# Patient Record
Sex: FEMALE | Race: WHITE | NOT HISPANIC OR LATINO | Employment: UNEMPLOYED | URBAN - METROPOLITAN AREA
[De-identification: names, ages, dates, MRNs, and addresses within clinical notes are randomized per-mention and may not be internally consistent; named-entity substitution may affect disease eponyms.]

---

## 2022-12-09 ENCOUNTER — OFFICE VISIT (OUTPATIENT)
Dept: FAMILY MEDICINE CLINIC | Facility: CLINIC | Age: 22
End: 2022-12-09

## 2022-12-09 VITALS
OXYGEN SATURATION: 99 % | BODY MASS INDEX: 26.65 KG/M2 | HEART RATE: 94 BPM | RESPIRATION RATE: 16 BRPM | TEMPERATURE: 97.2 F | HEIGHT: 66 IN | WEIGHT: 165.8 LBS

## 2022-12-09 DIAGNOSIS — K03.6 DENTAL PLAQUE ON MULTIPLE TEETH: ICD-10-CM

## 2022-12-09 DIAGNOSIS — Z13.220 SCREENING FOR LIPID DISORDERS: ICD-10-CM

## 2022-12-09 DIAGNOSIS — Z13.1 SCREENING FOR DIABETES MELLITUS: ICD-10-CM

## 2022-12-09 DIAGNOSIS — Z83.3 FAMILY HISTORY OF DIABETES MELLITUS IN FATHER: ICD-10-CM

## 2022-12-09 DIAGNOSIS — R56.9 SEIZURES (HCC): ICD-10-CM

## 2022-12-09 DIAGNOSIS — G04.00 ADEM (ACUTE DISSEMINATED ENCEPHALOMYELITIS): ICD-10-CM

## 2022-12-09 DIAGNOSIS — Z01.818 PREOP EXAMINATION: Primary | ICD-10-CM

## 2022-12-09 DIAGNOSIS — F84.0 AUTISTIC SPECTRUM DISORDER: ICD-10-CM

## 2022-12-09 DIAGNOSIS — R30.0 DYSURIA: ICD-10-CM

## 2022-12-09 DIAGNOSIS — K59.00 CONSTIPATION, UNSPECIFIED CONSTIPATION TYPE: ICD-10-CM

## 2022-12-09 DIAGNOSIS — N94.6 DYSMENORRHEA: ICD-10-CM

## 2022-12-09 LAB
SL AMB  POCT GLUCOSE, UA: ABNORMAL
SL AMB LEUKOCYTE ESTERASE,UA: 75
SL AMB POCT BILIRUBIN,UA: ABNORMAL
SL AMB POCT BLOOD,UA: 250
SL AMB POCT CLARITY,UA: CLEAR
SL AMB POCT COLOR,UA: CLEAR
SL AMB POCT KETONES,UA: ABNORMAL
SL AMB POCT NITRITE,UA: ABNORMAL
SL AMB POCT PH,UA: 7
SL AMB POCT SPECIFIC GRAVITY,UA: 1
SL AMB POCT URINE PROTEIN: ABNORMAL
SL AMB POCT UROBILINOGEN: ABNORMAL

## 2022-12-09 RX ORDER — QUETIAPINE FUMARATE 25 MG/1
25 TABLET, FILM COATED ORAL
COMMUNITY
Start: 2022-10-11

## 2022-12-09 RX ORDER — SACCHAROMYCES BOULARDII 250 MG
250 CAPSULE ORAL 2 TIMES DAILY
COMMUNITY

## 2022-12-09 RX ORDER — QUETIAPINE FUMARATE 100 MG/1
100 TABLET, FILM COATED ORAL
COMMUNITY
Start: 2022-10-16

## 2022-12-09 RX ORDER — ARIPIPRAZOLE 2 MG/1
2 TABLET ORAL
COMMUNITY

## 2022-12-09 RX ORDER — ZONISAMIDE 100 MG/1
CAPSULE ORAL
COMMUNITY
Start: 2022-12-05

## 2022-12-09 RX ORDER — DIPHENOXYLATE HYDROCHLORIDE AND ATROPINE SULFATE 2.5; .025 MG/1; MG/1
1 TABLET ORAL DAILY
COMMUNITY

## 2022-12-09 RX ORDER — LORATADINE 10 MG/1
10 TABLET ORAL EVERY MORNING
COMMUNITY
Start: 2022-09-08

## 2022-12-09 RX ORDER — CHOLECALCIFEROL (VITAMIN D3) 25 MCG
1000 CAPSULE ORAL DAILY
COMMUNITY
Start: 2022-11-21

## 2022-12-09 RX ORDER — LORAZEPAM 2 MG/1
2 TABLET ORAL
COMMUNITY

## 2022-12-09 RX ORDER — DIAZEPAM 10 MG/2ML
GEL RECTAL ONCE
COMMUNITY

## 2022-12-09 RX ORDER — FLUOXETINE 10 MG/1
10 CAPSULE ORAL DAILY
COMMUNITY
Start: 2022-12-06

## 2022-12-09 RX ORDER — POLYETHYLENE GLYCOL 3350 17 G/DOSE
POWDER (GRAM) ORAL
COMMUNITY
Start: 2022-09-10

## 2022-12-09 RX ORDER — LAMOTRIGINE 150 MG/1
150 TABLET ORAL EVERY MORNING
COMMUNITY
Start: 2022-12-05

## 2022-12-09 NOTE — PROGRESS NOTES
Assessment/Plan:    1  Preop examination    2  Dental plaque on multiple teeth    3  Dysuria  -     POCT urine dip auto non-scope    4  Autistic spectrum disorder    5  ADEM (acute disseminated encephalomyelitis)  -     CBC and differential; Future  -     Comprehensive metabolic panel; Future  -     Lipid panel; Future  -     Magnesium; Future  -     HEMOGLOBIN A1C W/ EAG ESTIMATION; Future  -     TSH, 3rd generation; Future  -     Hepatic function panel; Future  -     T4, free; Future  -     Vitamin D 25 hydroxy; Future  -     Iron, TIBC and Ferritin Panel; Future  -     Lead, blood; Future  -     Mercury, blood; Future  -     Zonisamide level; Future  -     Lamotrigine level; Future  -     Gamma GT; Future  -     CBC and differential  -     Comprehensive metabolic panel  -     Lipid panel  -     Magnesium  -     HEMOGLOBIN A1C W/ EAG ESTIMATION  -     TSH, 3rd generation  -     Hepatic function panel  -     T4, free  -     Vitamin D 25 hydroxy  -     Lead, blood  -     Mercury, blood  -     Zonisamide level  -     Lamotrigine level  -     Gamma GT    6  Seizures ([address] )  -     T4, free; Future  -     Vitamin D 25 hydroxy; Future  -     Iron, TIBC and Ferritin Panel; Future  -     Lead, blood; Future  -     Mercury, blood; Future  -     Zonisamide level; Future  -     Lamotrigine level; Future  -     Gamma GT; Future  -     T4, free  -     Vitamin D 25 hydroxy  -     Lead, blood  -     Mercury, blood  -     Zonisamide level  -     Lamotrigine level  -     Gamma GT    7  Dysmenorrhea    8  Constipation, unspecified constipation type    9  Screening for lipid disorders    10  Screening for diabetes mellitus  -     HEMOGLOBIN A1C W/ EAG ESTIMATION; Future  -     HEMOGLOBIN A1C W/ EAG ESTIMATION    11  Family history of diabetes mellitus in father  -     HEMOGLOBIN A1C W/ EAG ESTIMATION; Future  -     HEMOGLOBIN A1C W/ EAG ESTIMATION    12   BMI 26 0-26 9,adult        forms completed /faxed    Subjective:      Patient ID: Daina Jackson is a 20 y o  female  Chief Complaint   Patient presents with   • Pre-op Exam     Dental cleaning    • Physical Exam       HPI  22yo developmentally delayed pt in w mother for pre-op exam for dental cleaning/work to be done under general anesthesia,   Date of procedure: 12/19/2022  Dental specialist/oral surgeon thru [address] [facility]  No acute c/o at time of visit and no known recent illness exposures  Pertinent medical and surgical history reviewed in problem lists  Pt able to walk 4 blocks or 2 flights of stairs without any concerning cardiovascular symptoms  Pt w/o symptoms of easy bruising/bleeding/chest pain/palitations/new or changing edema/cough/wheezing/dyspnea  Pt w no hx of alcohol intake, tobacco or illicit drug use  The patient's home  Living situation is secure and supportive and there are no post-op concerns in this regard  The following portions of the patient's history were reviewed and updated as appropriate: allergies, current medications, past family history, past medical history, past social history, past surgical history and problem list     Past Medical History:   Diagnosis Date   • ADEM (acute disseminated encephalomyelitis)    • Developmental delay    • Expressive dysphasia    • Humerus fracture     right--nondisplaced   • Seizures ([address] ) 2007     Past Surgical History:   Procedure Laterality Date   • DENTAL EXAMINATION UNDER ANESTHESIA      every 3 years     Review of Systems   Unable to perform ROS: Other (developmental delay; Expressive language d/o)         Current Outpatient Medications   Medication Sig Dispense Refill   • ARIPiprazole (ABILIFY) 2 mg tablet Take 2 mg by mouth     • CVS D3 25 MCG (1000 UT) capsule Take 1,000 Units by mouth daily     • CVS Purelax 17 GM/SCOOP powder 17 GM BY MOUTH EVERY MORNING GIVE 8 TSP EVERY MORNING DISSOLVE IN 8OZ OF WATER       • diazepam (Diastat AcuDial) 10 mg Insert into the rectum once     • FLUoxetine (PROzac) 10 mg capsule Take 10 mg by mouth daily     • lamoTRIgine (LaMICtal) 150 MG tablet Take 150 mg by mouth every morning     • loratadine (CLARITIN) 10 mg tablet Take 10 mg by mouth every morning     • LORazepam (ATIVAN) 2 mg tablet Take 2 mg by mouth     • multivitamin (THERAGRAN) TABS Take 1 tablet by mouth daily     • QUEtiapine (SEROquel) 100 mg tablet Take 100 mg by mouth daily at bedtime     • QUEtiapine (SEROquel) 25 mg tablet Take 25 mg by mouth daily at bedtime     • saccharomyces boulardii (FLORASTOR) 250 mg capsule Take 250 mg by mouth 2 (two) times a day     • Trimethobenzamide-Benzocaine (TEBAMIDE RE) Insert into the rectum     • zonisamide (ZONEGRAN) 100 mg capsule TAKE 1 CAPSULE BY MOUTH AT BEDTIME  INS ONLY COVERS 30 DAYS       No current facility-administered medications for this visit  No Known Allergies       Objective:    Pulse 94   Temp (!) 97 2 °F (36 2 °C)   Resp 16   Ht 5' 6" (1 676 m)   Wt 75 2 kg (165 lb 12 8 oz)   SpO2 99%   BMI 26 76 kg/m²        Physical Exam  Vitals (unable to obtain BP due to pt anxiety) and nursing note reviewed  Constitutional:       General: She is not in acute distress  Eyes:      General: No scleral icterus  Conjunctiva/sclera: Conjunctivae normal    Cardiovascular:      Rate and Rhythm: Normal rate and regular rhythm  Pulmonary:      Effort: Pulmonary effort is normal  No respiratory distress  Breath sounds: Normal breath sounds  Abdominal:      General: Bowel sounds are normal       Palpations: Abdomen is soft  Tenderness: There is no abdominal tenderness  Musculoskeletal:      Cervical back: Neck supple  Right lower leg: No edema  Left lower leg: No edema  Skin:     General: Skin is warm and dry  Coloration: Skin is not jaundiced  Neurological:      Mental Status: She is alert        Comments: Developmentally delayed;  Expressive dysphasia   Psychiatric:      Comments: anxious        Disha Valle MD

## 2022-12-11 PROBLEM — K05.10: Status: ACTIVE | Noted: 2022-12-11

## 2022-12-11 PROBLEM — K03.6: Status: ACTIVE | Noted: 2022-12-11

## 2022-12-19 LAB — HBA1C MFR BLD HPLC: 4.7 %

## 2023-01-03 ENCOUNTER — TELEPHONE (OUTPATIENT)
Dept: FAMILY MEDICINE CLINIC | Facility: CLINIC | Age: 23
End: 2023-01-03

## 2023-01-03 NOTE — TELEPHONE ENCOUNTER
Dr Joy Tobin:    Patient's mother called asking for her results from [facility]  Please c/b to discuss further

## 2023-01-05 NOTE — TELEPHONE ENCOUNTER
Labs d/w mother-multiple abnls -including hgb=6 8 (last check from prior records found 2018=11 6)  Pt will need anemia work-up and other needed evals/testing as inpt as will need sedation to enable tests to be accomplished  Will d/w pt's neurologist-Dr Hasmukh Tejeda see if able to admit under specialist service at [facility]  LM to receive callback from [facility] in [address] [place] [address]; [facility]-#[phone]

## 2023-01-06 DIAGNOSIS — Z00.00 HEALTHCARE MAINTENANCE: ICD-10-CM

## 2023-01-06 DIAGNOSIS — K59.00 CONSTIPATION, UNSPECIFIED CONSTIPATION TYPE: Primary | ICD-10-CM

## 2023-01-06 RX ORDER — POLYETHYLENE GLYCOL 3350 17 G/DOSE
17 POWDER (GRAM) ORAL DAILY
Qty: 507 G | Refills: 3 | Status: SHIPPED | OUTPATIENT
Start: 2023-01-06

## 2023-01-06 RX ORDER — CHOLECALCIFEROL (VITAMIN D3) 25 MCG
1000 CAPSULE ORAL DAILY
Qty: 90 CAPSULE | Refills: 3 | Status: SHIPPED | OUTPATIENT
Start: 2023-01-06

## 2023-01-06 RX ORDER — LORATADINE 10 MG/1
10 TABLET ORAL EVERY MORNING
Qty: 90 TABLET | Refills: 3 | Status: SHIPPED | OUTPATIENT
Start: 2023-01-06

## 2023-01-11 DIAGNOSIS — G04.00 ADEM (ACUTE DISSEMINATED ENCEPHALOMYELITIS): ICD-10-CM

## 2023-01-11 DIAGNOSIS — F84.0 AUTISTIC SPECTRUM DISORDER: Primary | ICD-10-CM

## 2023-01-11 NOTE — TELEPHONE ENCOUNTER
Spoke w Dr Georgette Acuña 1/5/2023 re:pt labs-pt unable to be admitted to specialist's service  Advised ED  Parents took pt for ED eval at [facility]  Spoke w triage nurse Ean Rodriguez) and to MARRY Aparicio-#[phone]  Pt sedated--rpt labs showed hgb=9 7  No further work-up done--pt d/c'd for outpt follow-up  Recommended mom contact [facility] and ins co (has tried in past) to find out other available services in area  Will also further investigate services thru [facility]

## 2023-01-12 ENCOUNTER — PATIENT OUTREACH (OUTPATIENT)
Dept: FAMILY MEDICINE CLINIC | Facility: CLINIC | Age: 23
End: 2023-01-12

## 2023-01-12 NOTE — PROGRESS NOTES
Request to contact pt's parents to assist in locating services for pt and family  Reviewed chart  Pt has been involved with [facility] previously  ANIKET did not locate services within [place] [address], contacted care team as well; no services available within the network  ANIKET located several resources and agencies within the county:    [facility] in [place]: https://abilitiesnw  com/  [facility] in [place]: CreditCardReferences is  org/  [facility] in [place]:  http://www Glimpse.com/  org/  [place]: http://[employer]/  [employer]/[place]/connect-locally/service-center-locations/[facility]-behavioral-health-fvvsvfvl-rbvxvz-srbsermfnvo-[place] html    SW called pt's mother, introduced self and role in CM SW  They are new to area  The problem is that pt needs to be sedated when she gets bloodwork  They had to take pt to ED for bloodwork as pt fights when she gets bloodwork and sedation, is comative and threw up from Zofran  Tonie said no ED or [facility] knows of any place that works with adults with difficulties such as this  ANIKET could not provide any locations that may help with pt, but did offer names of the agencies above  Tonie is familiar with the [facility] and [facility] and these are right near her home, but pt remains home, versus going to a day program  However, [facility] is also looking for gynecology for pt  ANIKET suggested Tonie reach out to the agencies, as the agencies would best be able to provide some guidance, alternatives and recommendations  ANIKET offered to email the links to [facility], she stated would appreciate this    ANIKET then asked if she needed guidance from [facility] for diabetes, but Tonie declined, as pt is very difficult overall  ANIKET told Tonie that email will be sent to her today and SW will relay the above to Dr Amy Rosenberg  SW advised Tonie to feel free to contact this SW anytime with any concerns, questions or need for resources   Tonie appreciated the help  [email or web address]   Email sent   Note routed to Dr Dilcia Gonzalez

## 2023-01-18 ENCOUNTER — TELEPHONE (OUTPATIENT)
Dept: FAMILY MEDICINE CLINIC | Facility: CLINIC | Age: 23
End: 2023-01-18

## 2023-01-18 NOTE — TELEPHONE ENCOUNTER
DR Hua Dobson    Patient's mom wants to know what vitamin and dosage she should give patient due to lab results  She believes magnesium and iron are low  Please advise

## 2023-01-21 NOTE — TELEPHONE ENCOUNTER
Please inform that calcium also a little low so she may want to try OTC Pepcid complete--it has calcium and magnesium plus famotidine which will guard against gastritis  For the iron there are several chewable tabs and gummies--so whichever flavor/consistency she likes--some made by [employer] and [employer] and also they make a Flinstones vitamin w iron-even though its for kids she'll get the iron and make prefer the taste over the others      Also please find out if any of the agency names she was given were able to help ---  thanks

## 2023-02-01 ENCOUNTER — TELEPHONE (OUTPATIENT)
Dept: FAMILY MEDICINE CLINIC | Facility: CLINIC | Age: 23
End: 2023-02-01

## 2023-02-01 NOTE — TELEPHONE ENCOUNTER
Dr Aliza Lind:    Patient's mother called asking if you can place an order for cologuard  Please advise when order is in chart

## 2023-02-01 NOTE — TELEPHONE ENCOUNTER
Please advise I can place the order for Cologuard however I am not sure if the insurance will cover as colon cancer screening is recommended to start at age 39  She will need to check with her insurance plan in regards to this and then let us know if she still wants ordered

## 2023-02-07 PROBLEM — Z13.220 SCREENING FOR LIPID DISORDERS: Status: RESOLVED | Noted: 2022-12-09 | Resolved: 2023-02-07

## 2023-03-01 ENCOUNTER — OFFICE VISIT (OUTPATIENT)
Dept: FAMILY MEDICINE CLINIC | Facility: CLINIC | Age: 23
End: 2023-03-01

## 2023-03-01 VITALS
HEART RATE: 80 BPM | TEMPERATURE: 98.2 F | OXYGEN SATURATION: 99 % | WEIGHT: 159.8 LBS | BODY MASS INDEX: 25.68 KG/M2 | HEIGHT: 66 IN

## 2023-03-01 DIAGNOSIS — F84.0 AUTISTIC SPECTRUM DISORDER: ICD-10-CM

## 2023-03-01 DIAGNOSIS — R05.9 COUGH, UNSPECIFIED TYPE: Primary | ICD-10-CM

## 2023-03-01 DIAGNOSIS — R09.81 NASAL CONGESTION: ICD-10-CM

## 2023-03-01 DIAGNOSIS — E55.9 VITAMIN D DEFICIENCY: ICD-10-CM

## 2023-03-01 RX ORDER — AZITHROMYCIN 250 MG/1
TABLET, FILM COATED ORAL
Qty: 6 TABLET | Refills: 0 | Status: SHIPPED | OUTPATIENT
Start: 2023-03-01 | End: 2023-03-06

## 2023-03-01 RX ORDER — FLUTICASONE PROPIONATE 50 MCG
2 SPRAY, SUSPENSION (ML) NASAL DAILY
Qty: 16 G | Refills: 2 | Status: SHIPPED | OUTPATIENT
Start: 2023-03-01

## 2023-03-01 RX ORDER — CALCIUM CARBONATE/VITAMIN D3 600 MG-20
1 TABLET ORAL DAILY
Qty: 90 CAPSULE | Refills: 3 | Status: SHIPPED | OUTPATIENT
Start: 2023-03-01

## 2023-03-08 ENCOUNTER — TELEPHONE (OUTPATIENT)
Dept: FAMILY MEDICINE CLINIC | Facility: CLINIC | Age: 23
End: 2023-03-08

## 2023-03-08 NOTE — TELEPHONE ENCOUNTER
Pt saw Dr Chelsea Harrison for a sinus infection on 3/1  After finishing abx she still has the same symptoms with cough and stuffed note (clear)  Mom has been giving her dayquil and nyquil for 7 days  Is it safe to continue giving it to her?

## 2023-03-31 NOTE — PROGRESS NOTES
Assessment/Plan:    1  Cough, unspecified type  -     azithromycin (Zithromax) 250 mg tablet; Take 2 tablets (500 mg total) by mouth daily for 1 day, THEN 1 tablet (250 mg total) daily for 4 days  2  Nasal congestion  -     fluticasone (FLONASE) 50 mcg/act nasal spray; 2 sprays into each nostril daily    3  Vitamin D deficiency  -     Cholecalciferol (CVS D3) 50 MCG (2000 UT) CAPS; Take 1 capsule (2,000 Units total) by mouth in the morning    4  Autistic spectrum disorder    5  BMI 25 0-25 9,adult       Subjective:      Patient ID: Lois Rodriguez is a 20 y o  female      Chief Complaint   Patient presents with   • Cough     Pt started with cough on Sunday , post nasal no other sxs       HPI  Autistic nonverbal pt in w mother (Tonie) who reports pt w cough/congestion over past week  No fever in office today--?on/off at home, no rash or gi sxs  Has tried some otc preps w limited relief  No sig change in appetite, UO ok      The following portions of the patient's history were reviewed and updated as appropriate: allergies, current medications, past family history, past medical history, past social history, past surgical history and problem list     Review of Systems   Unable to perform ROS: Patient nonverbal         Current Outpatient Medications   Medication Sig Dispense Refill   • ARIPiprazole (ABILIFY) 2 mg tablet Take 2 mg by mouth     • Cholecalciferol (CVS D3) 50 MCG (2000 UT) CAPS Take 1 capsule (2,000 Units total) by mouth in the morning 90 capsule 3   • CVS Purelax 17 GM/SCOOP powder Take 17 g by mouth daily 507 g 3   • diazepam (DIASTAT) 10 mg Insert into the rectum once     • Ferrous Gluconate-C-Folic Acid (IRON-C PO) Take 65 mg by mouth     • FLUoxetine (PROzac) 10 mg capsule Take 10 mg by mouth daily     • fluticasone (FLONASE) 50 mcg/act nasal spray 2 sprays into each nostril daily 16 g 2   • lamoTRIgine (LaMICtal) 150 MG tablet Take 150 mg by mouth every morning     • loratadine (CLARITIN) 10 mg "tablet Take 1 tablet (10 mg total) by mouth every morning 90 tablet 3   • LORazepam (ATIVAN) 2 mg tablet Take 2 mg by mouth     • Magnesium 70 MG CAPS Take by mouth     • multivitamin (THERAGRAN) TABS Take 1 tablet by mouth daily     • QUEtiapine (SEROquel) 100 mg tablet Take 100 mg by mouth daily at bedtime     • QUEtiapine (SEROquel) 25 mg tablet Take 25 mg by mouth daily at bedtime     • saccharomyces boulardii (FLORASTOR) 250 mg capsule Take 250 mg by mouth 2 (two) times a day     • Trimethobenzamide-Benzocaine (TEBAMIDE RE) Insert into the rectum     • zonisamide (ZONEGRAN) 100 mg capsule TAKE 1 CAPSULE BY MOUTH AT BEDTIME  INS ONLY COVERS 30 DAYS       No current facility-administered medications for this visit  Objective:    Pulse 80   Temp 98 2 °F (36 8 °C)   Ht 5' 6\" (1 676 m)   Wt 72 5 kg (159 lb 12 8 oz)   LMP 02/23/2023   SpO2 99%   BMI 25 79 kg/m²        Physical Exam  Vitals and nursing note reviewed  HENT:      Nose: Congestion present  Cardiovascular:      Rate and Rhythm: Normal rate and regular rhythm  Pulmonary:      Effort: Pulmonary effort is normal  No respiratory distress  Breath sounds: Normal breath sounds  Skin:     General: Skin is warm and dry  Findings: No rash  Neurological:      Mental Status: She is alert     Psychiatric:      Comments: Cooperative w exam           En Licona MD  "

## 2023-04-25 ENCOUNTER — TELEPHONE (OUTPATIENT)
Dept: FAMILY MEDICINE CLINIC | Facility: CLINIC | Age: 23
End: 2023-04-25

## 2023-04-25 NOTE — TELEPHONE ENCOUNTER
Pt takes Claritin and Flonase--both have refills on at pharmacy--does mom need new rx to another pharm?

## 2023-04-25 NOTE — TELEPHONE ENCOUNTER
Please see attached message  Pts mom lmom asking if allergy medication was called in for pt  Please advise  Thank you  Hi, this is Ann Sin  I'm calling in reference to my daughter Dru Arshad's birthday is [birth year] again Jem Claire  I'm looking to see about whether or not [facility] prescription for her allergy meds was okayed by Doctor Carrol Mccarthy for refills  She has one left  Anyway, if you can give me a call back, I would appreciate it  It's [phone], [phone]  Thank you  Gaby Griffin

## 2023-04-30 PROBLEM — R05.9 COUGH: Status: RESOLVED | Noted: 2023-03-01 | Resolved: 2023-04-30

## 2023-05-27 DIAGNOSIS — R09.81 NASAL CONGESTION: ICD-10-CM

## 2023-05-27 RX ORDER — FLUTICASONE PROPIONATE 50 MCG
SPRAY, SUSPENSION (ML) NASAL
Qty: 16 ML | Refills: 2 | Status: SHIPPED | OUTPATIENT
Start: 2023-05-27

## 2023-06-01 ENCOUNTER — TELEPHONE (OUTPATIENT)
Dept: FAMILY MEDICINE CLINIC | Facility: CLINIC | Age: 23
End: 2023-06-01

## 2023-06-01 NOTE — TELEPHONE ENCOUNTER
Hi, this is Emory Dandy from my line [employer]  We have received a standard return order document for the mutual patient  The patient first name is [facility] and the last name is Siddhartha Esquivel  The purchase order number is [id number]  In this standard written order document the questionary page is needed  So kindly get back the providers questionary page in order to ship the equipment to the patient for incontinence supplies  And our fax number is [phone] and our callback number is [phone]  Thank you  Have a great day

## 2023-06-05 NOTE — TELEPHONE ENCOUNTER
Dr Sydnie Llanes:    Patient's mother called stating that medline is going to be refaxing over another order for diapers  The original request was sent over with the wrong size diaper  Patient needs a medium instead of large  Once received please fax to medline so order can be shipped to patient's house

## 2023-08-27 DIAGNOSIS — R09.81 NASAL CONGESTION: ICD-10-CM

## 2023-08-28 RX ORDER — FLUTICASONE PROPIONATE 50 MCG
SPRAY, SUSPENSION (ML) NASAL
Qty: 16 ML | Refills: 2 | Status: SHIPPED | OUTPATIENT
Start: 2023-08-28

## 2023-11-21 DIAGNOSIS — K59.00 CONSTIPATION, UNSPECIFIED CONSTIPATION TYPE: ICD-10-CM

## 2023-11-22 RX ORDER — POLYETHYLENE GLYCOL 3350 17 G/17G
POWDER, FOR SOLUTION ORAL
Qty: 510 G | Refills: 3 | Status: SHIPPED | OUTPATIENT
Start: 2023-11-22

## 2023-12-12 ENCOUNTER — TELEPHONE (OUTPATIENT)
Dept: FAMILY MEDICINE CLINIC | Facility: CLINIC | Age: 23
End: 2023-12-12

## 2023-12-27 ENCOUNTER — CONSULT (OUTPATIENT)
Dept: FAMILY MEDICINE CLINIC | Facility: CLINIC | Age: 23
End: 2023-12-27
Payer: COMMERCIAL

## 2023-12-27 VITALS
OXYGEN SATURATION: 98 % | TEMPERATURE: 97.3 F | HEART RATE: 98 BPM | BODY MASS INDEX: 23.95 KG/M2 | HEIGHT: 66 IN | WEIGHT: 149 LBS

## 2023-12-27 DIAGNOSIS — R56.9 SEIZURES (HCC): ICD-10-CM

## 2023-12-27 DIAGNOSIS — Z01.818 PREOP EXAMINATION: Primary | ICD-10-CM

## 2023-12-27 DIAGNOSIS — G04.00 ADEM (ACUTE DISSEMINATED ENCEPHALOMYELITIS): ICD-10-CM

## 2023-12-27 DIAGNOSIS — N93.9 ABNORMAL UTERINE BLEEDING (AUB): ICD-10-CM

## 2023-12-27 DIAGNOSIS — Z13.220 SCREENING FOR LIPID DISORDERS: ICD-10-CM

## 2023-12-27 DIAGNOSIS — F84.0 AUTISTIC SPECTRUM DISORDER: ICD-10-CM

## 2023-12-27 PROCEDURE — 99243 OFF/OP CNSLTJ NEW/EST LOW 30: CPT | Performed by: FAMILY MEDICINE

## 2023-12-27 PROCEDURE — 81003 URINALYSIS AUTO W/O SCOPE: CPT | Performed by: FAMILY MEDICINE

## 2023-12-27 RX ORDER — ARIPIPRAZOLE 10 MG/1
10 TABLET ORAL EVERY MORNING
COMMUNITY
Start: 2023-12-04

## 2023-12-28 NOTE — ASSESSMENT & PLAN NOTE
Pt follows with Dr. Buck Leos at the [place] [facility] at [facility]--  Ph#: [phone]; Fax#: [phone]

## 2023-12-28 NOTE — PROGRESS NOTES
Assessment/Plan:    1. Preop examination  Assessment & Plan:  PT MEDICALLY CLEARED FOR PROCEDURE.    Orders:  -     POCT urine dip auto non-scope    2. Abnormal uterine bleeding (AUB)  -     Iron, TIBC and Ferritin Panel; Future  -     Magnesium; Future  -     APTT; Future  -     Protime-INR; Future  -     Magnesium  -     APTT  -     Protime-INR    3. ADEM (acute disseminated encephalomyelitis)  Assessment & Plan:  Pt follows with Dr. Buck Leos at the [place] [facility]--  Ph#: [phone]; Fax#: [phone]    Orders:  -     Vitamin D 25 hydroxy; Future  -     Vitamin D 25 hydroxy    4. Autistic spectrum disorder  Assessment & Plan:  Follows w neuro--as above      5. Seizures (HCC)  Assessment & Plan:  Follows w neurology as above      6. Screening for lipid disorders  -     Lipid Panel with Direct LDL reflex; Future  -     Lipid Panel with Direct LDL reflex    7. BMI 24.0-24.9, adult            Subjective:      Patient ID: Yolande Archibald is a 23 y.o. female.    Chief Complaint   Patient presents with   • Pre-op Exam     Gyn internal exam/pap/possible hysteroscopy-   Dr. Ame Garrett in [place] 1/2/24       HPI  22yo pt in for pre-op exam for gyn exam under anesthesia, PAP, possible hysteroscopy; Date of procedure:1/2/24. gyn: Dr. Ame Garrett. Indication: Abnormal Uterine Bleeding (AUB)  Caregiver-mother, Tonie, reports no acute c/o at time of visit for pt  and no known recent illness exposures.    Reports  patient with +nausea/vomiting with prior anesthesia.    Pertinent medical and surgical history reviewed in problem lists.  Pt able to walk 4 blocks or 2 flights of stairs without any concerning cardiovascular symptoms.     No current symptoms of new or changing edema/cough/wheezing/dyspnea.    No alcohol intake, tobacco or illicit drug use.      The patient's home  Living situation is secure and supportive and there are no post-op concerns in this regard.    The  following portions of the patient's history were reviewed and updated as appropriate: allergies, current medications, past family history, past medical history, past social history, past surgical history and problem list.     Past Medical History:   Diagnosis Date   • Abnormal uterine bleeding (AUB)    • ADEM (acute disseminated encephalomyelitis)    • Anemia    • Developmental delay    • Expressive dysphasia    • Humerus fracture     right--nondisplaced   • Seizures (HCC) 2007     Past Surgical History:   Procedure Laterality Date   • DENTAL EXAMINATION UNDER ANESTHESIA      every 3 years      Review of Systems    Unable to obtain from pt    Current Outpatient Medications   Medication Sig Dispense Refill   • ARIPiprazole (ABILIFY) 10 mg tablet Take 10 mg by mouth every morning     • Cholecalciferol (CVS D3) 50 MCG (2000 UT) CAPS Take 1 capsule (2,000 Units total) by mouth in the morning 90 capsule 3   • diazepam (DIASTAT) 10 mg Insert into the rectum once     • Ferrous Gluconate-C-Folic Acid (IRON-C PO) Take 65 mg by mouth     • FLUoxetine (PROzac) 10 mg capsule Take 10 mg by mouth daily     • fluticasone (FLONASE) 50 mcg/act nasal spray SPRAY 2 SPRAYS INTO EACH NOSTRIL EVERY DAY 16 mL 2   • lamoTRIgine (LaMICtal) 150 MG tablet Take 150 mg by mouth every morning     • loratadine (CLARITIN) 10 mg tablet Take 1 tablet (10 mg total) by mouth every morning 90 tablet 3   • LORazepam (ATIVAN) 2 mg tablet Take 2 mg by mouth     • Magnesium 70 MG CAPS Take by mouth     • multivitamin (THERAGRAN) TABS Take 1 tablet by mouth daily     • polyethylene glycol (GLYCOLAX) 17 GM/SCOOP powder 17 GM IN MORNING DISSOLVE IN 8OZ OF WATER. 510 g 3   • QUEtiapine (SEROquel) 100 mg tablet Take 100 mg by mouth daily at bedtime     • QUEtiapine (SEROquel) 25 mg tablet Take 25 mg by mouth daily at bedtime     • saccharomyces boulardii (FLORASTOR) 250 mg capsule Take 250 mg by mouth 2 (two) times a day     • Trimethobenzamide-Benzocaine  "(TEBAMIDE RE) Insert into the rectum     • zonisamide (ZONEGRAN) 100 mg capsule TAKE 1 CAPSULE BY MOUTH AT BEDTIME. INS ONLY COVERS 30 DAYS       No current facility-administered medications for this visit.   No Known Allergies    Objective:    Pulse 98   Temp (!) 97.3 °F (36.3 °C)   Ht 5' 6\" (1.676 m)   Wt 67.6 kg (149 lb)   LMP 12/27/2023   SpO2 98%   BMI 24.05 kg/m²   Unable to obtain BP at visit today due to pt anxiety--see past readings below from chart review.     Physical Exam  Vitals (Past BPs at time of procedures: 121/88; 140/85) and nursing note reviewed.   Constitutional:       General: She is not in acute distress.  Eyes:      General: No scleral icterus.     Conjunctiva/sclera: Conjunctivae normal.   Cardiovascular:      Rate and Rhythm: Normal rate and regular rhythm.   Pulmonary:      Effort: Pulmonary effort is normal. No respiratory distress.      Breath sounds: Normal breath sounds.   Abdominal:      General: Bowel sounds are normal.      Palpations: Abdomen is soft.      Tenderness: There is no abdominal tenderness.   Musculoskeletal:      Cervical back: Neck supple.      Right lower leg: No edema.      Left lower leg: No edema.   Skin:     General: Skin is warm and dry.      Coloration: Skin is not jaundiced.   Neurological:      Mental Status: She is alert.      Comments: Developmentally delayed;  Expressive dysphasia   Psychiatric:      Comments: anxious           Kathleen Bailey MD  "

## 2024-01-08 ENCOUNTER — TELEPHONE (OUTPATIENT)
Age: 24
End: 2024-01-08

## 2024-01-08 NOTE — TELEPHONE ENCOUNTER
Patient's mother, Tonie, called to let Dr. Bailey know that the patient had a procedure done at [facility] on 1/2/24 and also had labs done.  She is requesting a call back. She would like to know if Dr. Bailey could review the lab results as she stated they were all over the place.

## 2024-01-16 NOTE — TELEPHONE ENCOUNTER
Labs reviewed w mother--questions addressed--can decrease mvi to 3d per week as B12/folate above range.  Mild anemia on CBC which should correct further as vagina bleeding starting to slow and pt started on norethindrone(progestin) only pill by gyn to help further regulate.   She will cont on iron supp for now until value within range.  Glucose=136 (past gdp8k=8.8 and remote=5.1) -noted pt also on abilify which may raise value.  Will cont. to periodically monitor.

## 2024-01-17 DIAGNOSIS — Z00.00 HEALTHCARE MAINTENANCE: ICD-10-CM

## 2024-01-17 RX ORDER — LORATADINE 10 MG/1
10 TABLET ORAL EVERY MORNING
Qty: 30 TABLET | Refills: 5 | Status: SHIPPED | OUTPATIENT
Start: 2024-01-17

## 2024-02-21 PROBLEM — Z13.220 SCREENING FOR LIPID DISORDERS: Status: RESOLVED | Noted: 2022-12-09 | Resolved: 2024-02-21

## 2024-02-28 DIAGNOSIS — E55.9 VITAMIN D DEFICIENCY: ICD-10-CM

## 2024-02-28 RX ORDER — ACETAMINOPHEN 160 MG
TABLET,DISINTEGRATING ORAL
Qty: 90 CAPSULE | Refills: 1 | Status: SHIPPED | OUTPATIENT
Start: 2024-02-28

## 2024-03-11 DIAGNOSIS — K59.00 CONSTIPATION, UNSPECIFIED CONSTIPATION TYPE: ICD-10-CM

## 2024-03-11 RX ORDER — POLYETHYLENE GLYCOL 3350 17 G/17G
POWDER, FOR SOLUTION ORAL
Qty: 510 G | Refills: 5 | Status: SHIPPED | OUTPATIENT
Start: 2024-03-11

## 2024-04-29 ENCOUNTER — TELEPHONE (OUTPATIENT)
Age: 24
End: 2024-04-29

## 2024-04-29 NOTE — TELEPHONE ENCOUNTER
Patient is expecting form from [facility] from diapers to be ordered from PCP . Has office received form yet? Maybe in PCP bin? If office has received please notify patients mother. If not should be getting a new form faxed over today Office clerical unavailable at time of call

## 2024-05-01 NOTE — TELEPHONE ENCOUNTER
Dr. Bailey:    Spoke w/patient's mother, downloaded form and placed form in your folder at nurse's station for you to complete.  Please fax when done.

## 2024-05-23 ENCOUNTER — TELEPHONE (OUTPATIENT)
Age: 24
End: 2024-05-23

## 2024-05-23 NOTE — TELEPHONE ENCOUNTER
Trufa message was sent in mom'schart.      Good morning   I’m having the most difficult time getting the required paperwork/authorization required to receive diapers  for [place] from the new company her account was transferred to.   Old company was [employer], new company is home care [employer].   I’ve been going back and forth for 2 months (between your office and them ), I’ve been out of diapers since then.   Paperwork that was filled out was not the correct ones and was faxed to [employer] , not new company.   They said they’ve faxed over required prescriptions authorization/paperwork 3 times.   I’m very confused.   Your office told me this morning that they cannot speak to me, but yesterday they did.   PLEASE, I CAN REALLY USE YOUR HELP!

## 2024-05-24 NOTE — TELEPHONE ENCOUNTER
Spoke w/Tonie she states that supply company has faxed proper forms over to office.  Checked in [employer] still have not received forms.  Patient's mother will be calling back Tuesday morning to speak w/me directly - please transfer call to office. Telephone # for home care delivered is [phone].

## 2024-06-19 DIAGNOSIS — F41.9 ANXIETY: Primary | ICD-10-CM

## 2024-06-19 RX ORDER — ALPRAZOLAM 0.25 MG/1
0.25 TABLET ORAL 2 TIMES DAILY PRN
Qty: 30 TABLET | Refills: 0 | Status: SHIPPED | OUTPATIENT
Start: 2024-06-19

## 2024-07-09 DIAGNOSIS — Z00.00 HEALTHCARE MAINTENANCE: ICD-10-CM

## 2024-07-09 RX ORDER — LORATADINE 10 MG/1
10 TABLET ORAL EVERY MORNING
Qty: 30 TABLET | Refills: 5 | Status: SHIPPED | OUTPATIENT
Start: 2024-07-09

## 2024-07-23 ENCOUNTER — TELEPHONE (OUTPATIENT)
Age: 24
End: 2024-07-23

## 2024-07-23 DIAGNOSIS — K59.00 CONSTIPATION, UNSPECIFIED CONSTIPATION TYPE: Primary | ICD-10-CM

## 2024-07-23 NOTE — TELEPHONE ENCOUNTER
PA for Linzess 72mcg caps     Submitted via    [x]CMM-KEY [id number]  []Surescripts-Case ID #   []Faxed to plan   []Other website   []Phone call Case ID #     Office notes sent, clinical questions answered. Awaiting determination    Turnaround time for your insurance to make a decision on your Prior Authorization can take 7-21 business days.

## 2024-07-23 NOTE — TELEPHONE ENCOUNTER
PA for Linzess [address] [place] [address] Approved     Date(s) approved 7- - 7-      Patient advised by          [x] [employer] Message  [] Phone call   []LMOM  []L/M to call office as no active Communication consent on file  []Unable to leave detailed message as VM not approved on Communication consent       Pharmacy advised by    [x]Fax  []Phone call    Approval letter scanned into Media Yes

## 2024-08-20 DIAGNOSIS — E55.9 VITAMIN D DEFICIENCY: ICD-10-CM

## 2024-08-21 RX ORDER — ACETAMINOPHEN 160 MG
TABLET,DISINTEGRATING ORAL
Qty: 90 CAPSULE | Refills: 1 | Status: SHIPPED | OUTPATIENT
Start: 2024-08-21

## 2024-08-29 DIAGNOSIS — K59.00 CONSTIPATION, UNSPECIFIED CONSTIPATION TYPE: Primary | ICD-10-CM

## 2024-08-30 ENCOUNTER — TELEPHONE (OUTPATIENT)
Age: 24
End: 2024-08-30

## 2024-08-30 NOTE — TELEPHONE ENCOUNTER
Already in progress: Pt's mom Tonie requested a prior auth for the linzess 145mg.       Please contact Tonie with an update.     Thank you for your kind assistance.

## 2024-09-03 NOTE — TELEPHONE ENCOUNTER
Patients mom calling for an update on medication.  She would like to be called once authorizations in determined.    Thank you

## 2024-09-04 NOTE — TELEPHONE ENCOUNTER
PA for Linzess 145 mcg SUBMITTED     via    [x]CM-KEY: [id number]  [][employer]-Case ID #   []Faxed to plan   []Other website   []Phone call Case ID #     Office notes sent, clinical questions answered. Awaiting determination    Turnaround time for your insurance to make a decision on your Prior Authorization can take 7-21 business days.

## 2024-09-04 NOTE — TELEPHONE ENCOUNTER
Realized after I left a VM for pt's mom that the dosing changed, new dosing PA was submitted, as soon as we receive a response, we will notify her.

## 2024-09-05 NOTE — TELEPHONE ENCOUNTER
Patients mother called stating that she only has 10 more days left  of the lower dose and is asking the status on this prior auth. Please contact patients mother as soon as possible.

## 2024-09-05 NOTE — TELEPHONE ENCOUNTER
PA for Linzess 145 mcg APPROVED     Date(s) approved 9/4/24 - 7/22/25    Case # [id number]     Patient advised by          [][employer] Message  []Phone call   [x]LMOM  []L/M to call office as no active Communication consent on file  []Unable to leave detailed message as VM not approved on Communication consent       Pharmacy advised by    [x]Fax  []Phone call    Approval letter scanned into Media Yes

## 2024-09-07 DIAGNOSIS — K59.00 CONSTIPATION, UNSPECIFIED CONSTIPATION TYPE: ICD-10-CM

## 2024-09-07 RX ORDER — POLYETHYLENE GLYCOL 3350 17 G/17G
POWDER, FOR SOLUTION ORAL
Qty: 510 G | Refills: 5 | Status: SHIPPED | OUTPATIENT
Start: 2024-09-07

## 2024-09-09 ENCOUNTER — TELEPHONE (OUTPATIENT)
Age: 24
End: 2024-09-09

## 2024-09-09 DIAGNOSIS — K59.00 CONSTIPATION, UNSPECIFIED CONSTIPATION TYPE: Primary | ICD-10-CM

## 2024-09-09 NOTE — TELEPHONE ENCOUNTER
PT was taking Linzess and prescribed a higher dosage but was way too much for pt to take so would like that script cancelled and please call in the smaller dosage of 72mcg at 2 pills a day,if needed but will use 1 pill if normal BW are produced.  but doesn't want to have the higher dose due to wiggle room if needed.

## 2024-09-12 ENCOUNTER — TELEPHONE (OUTPATIENT)
Age: 24
End: 2024-09-12

## 2024-09-12 ENCOUNTER — TELEPHONE (OUTPATIENT)
Dept: OTHER | Facility: OTHER | Age: 24
End: 2024-09-12

## 2024-09-12 DIAGNOSIS — K59.00 CONSTIPATION, UNSPECIFIED CONSTIPATION TYPE: ICD-10-CM

## 2024-09-12 NOTE — TELEPHONE ENCOUNTER
Patient's mother called in regards of LinaClotide 72 mcg medication. Patient's mother stated that she had requested LinaClotide 72 mcg twice a day, the Rx was sent to the pharmacy for once daily. Please clarify. Patient's mother stated that 72 mcg once a day doesn't work for the patient.

## 2024-09-13 NOTE — TELEPHONE ENCOUNTER
Duplicate encounter created, please see telephone encounter from 7/23/2024 regarding Linzess 72 MCG PA status. Please review patient's chart to see if there is already an encounter regarding the medication in question and to document anything regarding this medication in regards to anything regarding the authorization process etc before creating another encounter Thank You.

## 2024-09-16 NOTE — TELEPHONE ENCOUNTER
Patient called the RX Refill Line. Message is being forwarded to the PA team.     The Pa that was completed on 07/23/20234 for Linzess 72mcg was for QD only.  The Dr increased her dose to 2 caps Daily.  Pharm is telling pt she needs a new PA.      Reason for call:   [] Refill   [x] Prior Auth  [] Other:     Office:   [x] PCP/Provider - : Kathleen Bailey MD   [] Specialty/Provider -     Medication:     linaCLOtide 72 MCG CAPS       Dose/Frequency:     Take 72 mcg by mouth 2 (two) times a day as needed (constipation) at least 30 minutes prior to the first meal of the day       Quantity: 180    Pharmacy:  [facility]/pharmacy #[id number] - [place], [place] - [address]     Does the patient have enough for 3 days?   [] Yes   [x] No - Send as HP to POD      Please contact patient at [phone] with any questions or updates

## 2024-09-16 NOTE — TELEPHONE ENCOUNTER
PA for Linzess 2 mcg (x2 daily) SUBMITTED     via    [x]CMM-KEY: [id number]  []Surescripts-Case ID #   []Faxed to plan   []Other website   []Phone call Case ID #     Office notes sent, clinical questions answered. Awaiting determination    Turnaround time for your insurance to make a decision on your Prior Authorization can take 7-21 business days.

## 2024-09-20 NOTE — TELEPHONE ENCOUNTER
Patient's mother, Tonie, called for an updated on the prior auth for Linzess 72mcg bid. She was informed that the prior auth has been submitted and that it can take up to 21 business days to receive a determination from the insurance. Tonie was also advised that she can call the insurance to confirm that they received the prior auth and that they are working on it.

## 2025-01-24 DIAGNOSIS — Z00.00 HEALTHCARE MAINTENANCE: ICD-10-CM

## 2025-01-25 RX ORDER — LORATADINE 10 MG/1
10 TABLET ORAL EVERY MORNING
Qty: 30 TABLET | Refills: 0 | Status: SHIPPED | OUTPATIENT
Start: 2025-01-25

## 2025-01-25 NOTE — TELEPHONE ENCOUNTER
Patient needs an appointment. Please contact the patient to schedule an appointment. Last office visit: 3/1/23

## 2025-01-29 DIAGNOSIS — E55.9 VITAMIN D DEFICIENCY: ICD-10-CM

## 2025-01-30 RX ORDER — ACETAMINOPHEN 160 MG
TABLET,DISINTEGRATING ORAL
Qty: 30 CAPSULE | Refills: 0 | Status: SHIPPED | OUTPATIENT
Start: 2025-01-30

## 2025-02-05 ENCOUNTER — OFFICE VISIT (OUTPATIENT)
Dept: FAMILY MEDICINE CLINIC | Facility: CLINIC | Age: 25
End: 2025-02-05
Payer: COMMERCIAL

## 2025-02-05 VITALS
TEMPERATURE: 98.4 F | BODY MASS INDEX: 26.29 KG/M2 | HEIGHT: 64 IN | WEIGHT: 154 LBS | RESPIRATION RATE: 16 BRPM | HEART RATE: 99 BPM | OXYGEN SATURATION: 99 %

## 2025-02-05 DIAGNOSIS — G04.00 ADEM (ACUTE DISSEMINATED ENCEPHALOMYELITIS): ICD-10-CM

## 2025-02-05 DIAGNOSIS — Z00.00 ANNUAL PHYSICAL EXAM: Primary | ICD-10-CM

## 2025-02-05 DIAGNOSIS — R56.9 SEIZURES (HCC): ICD-10-CM

## 2025-02-05 DIAGNOSIS — Z00.00 HEALTHCARE MAINTENANCE: ICD-10-CM

## 2025-02-05 DIAGNOSIS — F84.0 AUTISTIC SPECTRUM DISORDER: ICD-10-CM

## 2025-02-05 DIAGNOSIS — F41.9 ANXIETY: ICD-10-CM

## 2025-02-05 DIAGNOSIS — K59.00 CONSTIPATION, UNSPECIFIED CONSTIPATION TYPE: ICD-10-CM

## 2025-02-05 DIAGNOSIS — R30.0 DYSURIA: ICD-10-CM

## 2025-02-05 LAB
SL AMB  POCT GLUCOSE, UA: ABNORMAL
SL AMB LEUKOCYTE ESTERASE,UA: 125
SL AMB POCT BILIRUBIN,UA: ABNORMAL
SL AMB POCT BLOOD,UA: ABNORMAL
SL AMB POCT CLARITY,UA: CLEAR
SL AMB POCT COLOR,UA: YELLOW
SL AMB POCT KETONES,UA: ABNORMAL
SL AMB POCT NITRITE,UA: ABNORMAL
SL AMB POCT PH,UA: 7
SL AMB POCT SPECIFIC GRAVITY,UA: 1.01
SL AMB POCT URINE PROTEIN: ABNORMAL
SL AMB POCT UROBILINOGEN: 0.2

## 2025-02-05 PROCEDURE — 87086 URINE CULTURE/COLONY COUNT: CPT | Performed by: FAMILY MEDICINE

## 2025-02-05 PROCEDURE — 99395 PREV VISIT EST AGE 18-39: CPT | Performed by: FAMILY MEDICINE

## 2025-02-05 PROCEDURE — 81003 URINALYSIS AUTO W/O SCOPE: CPT | Performed by: FAMILY MEDICINE

## 2025-02-05 RX ORDER — NORETHINDRONE 5 MG/1
5 TABLET ORAL DAILY
COMMUNITY

## 2025-02-05 RX ORDER — LAMOTRIGINE 200 MG/1
200 TABLET ORAL EVERY MORNING
COMMUNITY
Start: 2025-01-29

## 2025-02-05 RX ORDER — LAMOTRIGINE 25 MG/1
25 TABLET ORAL DAILY
COMMUNITY

## 2025-02-05 RX ORDER — LORATADINE 10 MG/1
10 TABLET ORAL EVERY MORNING
Qty: 30 TABLET | Refills: 11 | Status: SHIPPED | OUTPATIENT
Start: 2025-02-05

## 2025-02-05 NOTE — PROGRESS NOTES
Adult Annual Physical  Name: Yolande Archibald      : 2000      MRN: [id number]  Encounter Provider: Kathleen Bailey MD  Encounter Date: 2025   Encounter department: [facility]    Assessment & Plan  Annual physical exam  Will get labs prior to next dental check- will be needed for sedation clearance  Orders:  •  POCT urine dip auto non-scope    ADEM (acute disseminated encephalomyelitis)  Follows w neurologist--Dr. David       Autistic spectrum disorder  Follows w neurologist       Seizures (HCC)  Follows w neurologist       Dysuria    Orders:  •  Urine culture    Healthcare maintenance    Orders:  •  loratadine (CLARITIN) 10 mg tablet; Take 1 tablet (10 mg total) by mouth every morning    Anxiety         Constipation, unspecified constipation type  Improved w dietary changes and otc agents  Off linzess         BMI 26.0-26.9,adult         Immunizations and preventive care screenings were discussed with patient today. Appropriate education was printed on patient's after visit summary.    Counseling:  Dental Health: discussed importance of regular tooth brushing, flossing, and dental visits.  Injury prevention: discussed safety/seat belts, safety helmets, smoke detectors, carbon monoxide detectors, and smoking near bedding or upholstery.  Exercise: the importance of regular exercise/physical activity was discussed. Recommend exercise 3-5 times per week for at least 30 minutes.          History of Present Illness     Adult Annual Physical  In w mother for physical  Overall has been doing better  Had gyn procedure last year  No further AUB--iron/hbg improved  Bowels more regular w dietary and otc agents--off Linzess  UA-+leukos--cx sent-no fever or malodor  Will get labs prior to next dental wk  Review of Systems   Reason unable to perform ROS: nonverbal.     Past Medical History   Past Medical History:   Diagnosis Date   • Abnormal uterine bleeding (AUB)    • ADEM (acute  disseminated encephalomyelitis)    • Anemia    • Developmental delay    • Expressive dysphasia    • Humerus fracture     right--nondisplaced   • Seizures (HCC) 2007     Past Surgical History:   Procedure Laterality Date   • DENTAL EXAMINATION UNDER ANESTHESIA      every 3 years     Family History   Problem Relation Age of Onset   • No Known Problems Mother    • Hypertension Father    • Heart attack Father    • Diabetes type II Father    • Hyperlipidemia Father       reports that she has never smoked. She has never used smokeless tobacco. She reports that she does not drink alcohol and does not use drugs.  Current Outpatient Medications on File Prior to Visit   Medication Sig Dispense Refill   • ALPRAZolam (XANAX) 0.25 mg tablet Take 1 tablet (0.25 mg total) by mouth 2 (two) times a day as needed for anxiety or sleep 30 tablet 0   • ARIPiprazole (ABILIFY) 10 mg tablet Take 10 mg by mouth every morning     • Cholecalciferol (Vitamin D3) 50 MCG (2000 UT) capsule TAKE 1 CAPSULE (2,000 UNITS) BY MOUTH IN THE MORNING 30 capsule 0   • diazepam (DIASTAT) 10 mg Insert into the rectum once     • Ferrous Gluconate-C-Folic Acid (IRON-C PO) Take 65 mg by mouth     • FLUoxetine (PROzac) 10 mg capsule Take 10 mg by mouth daily     • fluticasone (FLONASE) 50 mcg/act nasal spray SPRAY 2 SPRAYS INTO EACH NOSTRIL EVERY DAY 16 mL 2   • lamoTRIgine (LaMICtal) 200 MG tablet Take 200 mg by mouth every morning     • lamoTRIgine (LaMICtal) 25 mg tablet Take 25 mg by mouth daily     • LORazepam (ATIVAN) 2 mg tablet Take 2 mg by mouth     • Magnesium 70 MG CAPS Take by mouth     • multivitamin (THERAGRAN) TABS Take 1 tablet by mouth daily     • norethindrone (AYGESTIN) 5 mg tablet Take 5 mg by mouth daily     • polyethylene glycol (GLYCOLAX) 17 GM/SCOOP powder 17 GM IN MORNING DISSOLVE IN 8OZ OF WATER. 510 g 5   • QUEtiapine (SEROquel) 100 mg tablet Take 100 mg by mouth daily at bedtime     • QUEtiapine (SEROquel) 25 mg tablet Take 25 mg by  mouth daily at bedtime     • saccharomyces boulardii (FLORASTOR) 250 mg capsule Take 250 mg by mouth 2 (two) times a day     • Trimethobenzamide-Benzocaine (TEBAMIDE RE) Insert into the rectum     • zonisamide (ZONEGRAN) 100 mg capsule TAKE 1 CAPSULE BY MOUTH AT BEDTIME. INS ONLY COVERS 30 DAYS       No current facility-administered medications on file prior to visit.   No Known Allergies   Current Outpatient Medications on File Prior to Visit   Medication Sig Dispense Refill   • ALPRAZolam (XANAX) 0.25 mg tablet Take 1 tablet (0.25 mg total) by mouth 2 (two) times a day as needed for anxiety or sleep 30 tablet 0   • ARIPiprazole (ABILIFY) 10 mg tablet Take 10 mg by mouth every morning     • Cholecalciferol (Vitamin D3) 50 MCG (2000 UT) capsule TAKE 1 CAPSULE (2,000 UNITS) BY MOUTH IN THE MORNING 30 capsule 0   • diazepam (DIASTAT) 10 mg Insert into the rectum once     • Ferrous Gluconate-C-Folic Acid (IRON-C PO) Take 65 mg by mouth     • FLUoxetine (PROzac) 10 mg capsule Take 10 mg by mouth daily     • fluticasone (FLONASE) 50 mcg/act nasal spray SPRAY 2 SPRAYS INTO EACH NOSTRIL EVERY DAY 16 mL 2   • lamoTRIgine (LaMICtal) 200 MG tablet Take 200 mg by mouth every morning     • lamoTRIgine (LaMICtal) 25 mg tablet Take 25 mg by mouth daily     • LORazepam (ATIVAN) 2 mg tablet Take 2 mg by mouth     • Magnesium 70 MG CAPS Take by mouth     • multivitamin (THERAGRAN) TABS Take 1 tablet by mouth daily     • norethindrone (AYGESTIN) 5 mg tablet Take 5 mg by mouth daily     • polyethylene glycol (GLYCOLAX) 17 GM/SCOOP powder 17 GM IN MORNING DISSOLVE IN 8OZ OF WATER. 510 g 5   • QUEtiapine (SEROquel) 100 mg tablet Take 100 mg by mouth daily at bedtime     • QUEtiapine (SEROquel) 25 mg tablet Take 25 mg by mouth daily at bedtime     • saccharomyces boulardii (FLORASTOR) 250 mg capsule Take 250 mg by mouth 2 (two) times a day     • Trimethobenzamide-Benzocaine (TEBAMIDE RE) Insert into the rectum     • zonisamide (ZONEGRAN)  "100 mg capsule TAKE 1 CAPSULE BY MOUTH AT BEDTIME. INS ONLY COVERS 30 DAYS       No current facility-administered medications on file prior to visit.      Social History     Tobacco Use   • Smoking status: Never   • Smokeless tobacco: Never   Vaping Use   • Vaping status: Never Used   Substance and Sexual Activity   • Alcohol use: Never   • Drug use: Never   • Sexual activity: Never     No Known Allergies      There is no immunization history on file for this patient.    Objective   Pulse 99   Temp 98.4 °F (36.9 °C) (Temporal)   Resp 16   Ht 5' 3.5\" (1.613 m)   Wt 69.9 kg (154 lb)   SpO2 99%   BMI 26.85 kg/m²     Physical Exam  Vitals and nursing note reviewed.   Constitutional:       General: She is not in acute distress.  Eyes:      General: No scleral icterus.     Conjunctiva/sclera: Conjunctivae normal.   Cardiovascular:      Rate and Rhythm: Normal rate and regular rhythm.   Pulmonary:      Effort: Pulmonary effort is normal. No respiratory distress.      Breath sounds: Normal breath sounds.   Abdominal:      General: Bowel sounds are normal.      Palpations: Abdomen is soft.      Tenderness: There is no guarding.   Musculoskeletal:      Cervical back: Neck supple.      Right lower leg: No edema.      Left lower leg: No edema.   Skin:     General: Skin is warm and dry.      Coloration: Skin is not jaundiced.   Neurological:      Mental Status: She is alert.         "

## 2025-02-06 LAB — BACTERIA UR CULT: NORMAL

## 2025-02-26 ENCOUNTER — TELEPHONE (OUTPATIENT)
Age: 25
End: 2025-02-26

## 2025-02-26 NOTE — TELEPHONE ENCOUNTER
Pts mother called stating pt needs a prescription for a power wheelchair.    Fax: [phone]- [facility] this is where script needs to be sent to    Pts mother asked that she be contacted once order is placed and faxed

## 2025-02-27 NOTE — TELEPHONE ENCOUNTER
Please clarify where order is being sent and if any specifics needed for size of chair, etc. Or if just needs to state Power Chair w dx code--thanks

## 2025-02-27 NOTE — TELEPHONE ENCOUNTER
Patients mother called in returning Mary's call. Tonie stated she just needs a script for an evaluation for a power chair sent to [employer] at [phone] with a diagnosis code on it. The company will do the measuring at the evaluation. Thank you.

## 2025-03-11 DIAGNOSIS — E55.9 VITAMIN D DEFICIENCY: ICD-10-CM

## 2025-03-12 RX ORDER — ACETAMINOPHEN 160 MG
TABLET,DISINTEGRATING ORAL
Qty: 30 CAPSULE | Refills: 5 | Status: SHIPPED | OUTPATIENT
Start: 2025-03-12

## 2025-03-27 ENCOUNTER — TELEPHONE (OUTPATIENT)
Age: 25
End: 2025-03-27

## 2025-04-28 ENCOUNTER — TELEMEDICINE (OUTPATIENT)
Dept: FAMILY MEDICINE CLINIC | Facility: CLINIC | Age: 25
End: 2025-04-28
Payer: COMMERCIAL

## 2025-04-28 DIAGNOSIS — F84.0 AUTISTIC SPECTRUM DISORDER: ICD-10-CM

## 2025-04-28 DIAGNOSIS — R56.9 SEIZURES (HCC): ICD-10-CM

## 2025-04-28 DIAGNOSIS — G04.00 ADEM (ACUTE DISSEMINATED ENCEPHALOMYELITIS): Primary | ICD-10-CM

## 2025-04-28 PROCEDURE — 99213 OFFICE O/P EST LOW 20 MIN: CPT | Performed by: FAMILY MEDICINE

## 2025-04-28 NOTE — LETTER
"  April 28, 2025     Patient: Yolande Archibald  YOB: 2000  Date of Visit: 4/28/2025      To Whom it May Concern:    Yolande Archibald is under my professional care. Yolande is developmentally delayed and is medically diagnosed on the Autism Spectrum--code F84.0 with history of  ADEM (Acute disseminated encephalomyelitis)-dx code G04.00 and Seizure d/o R56.9.    Yolande has outgrown her Power Wheelchair which is also in disrepair and is in need of a new one to assist with MRADLS (Mobility Related Activities of Daily Living).  Current hgt 5'3.5\" and wgt =154 lb.    If you have any questions or concerns, please don't hesitate to call.         Sincerely,          Kathleen Bailey MD          "

## 2025-04-28 NOTE — LETTER
April 28, 2025     Patient: Yolande Archibald  YOB: 2000  Date of Visit: 4/28/2025      To Whom it May Concern:    Yolande Archibald is under my professional care. Yolande is developmentally disabled with medical history of ADEM/Autism and Seizure d/o.  Dx Codes: G04.00; F84.0; R56.9.    It is requested that due to medical necessity she be provided with a safety/harness seat belt to use during transportation.      If you have any questions or concerns, please don't hesitate to call.         Sincerely,          Kathleen Bailey MD

## 2025-04-29 NOTE — PROGRESS NOTES
Virtual Regular VisitName: Yolande Archibald      : 2000      MRN: [id number]  Encounter Provider: Kathleen Bailey MD  Encounter Date: 2025   Encounter department: [facility]  :  Assessment & Plan  ADEM (acute disseminated encephalomyelitis)  Requested letters written for insurance (see hpi)       Autistic spectrum disorder  At baseline       Seizures (HCC)  stable           History of Present Illness     HPI    Pt intellectually delayed  Mother in need of letter /ov note for insurance in support of Power Wheelchair for patient as well as  letter for safety seat belt for the car    Review of Systems  Unable to obtain--pt non-verbal    Objective   There were no vitals taken for this visit.    Physical Exam  No baseline change  Administrative Statements   Encounter provider Kathleen Bailey MD    The Patient is located at Home and in the following state in which I hold an active license [place].    The patient was identified by name and date of birth. Yolande Archibald was informed that this is a telemedicine visit and that the visit is being conducted through Telephone.  My office door was closed. No one else was in the room.  She acknowledged consent and understanding of privacy and security of the video platform. The patient has agreed to participate and understands they can discontinue the visit at any time.    I have spent a total time of 15 minutes in caring for this patient on the day of the visit/encounter including Risks and benefits of tx options, Instructions for management, Risk factor reductions, and Counseling / Coordination of care, not including the time spent for establishing the audio/video connection.

## 2025-05-28 ENCOUNTER — OFFICE VISIT (OUTPATIENT)
Dept: FAMILY MEDICINE CLINIC | Facility: CLINIC | Age: 25
End: 2025-05-28
Payer: COMMERCIAL

## 2025-05-28 VITALS
BODY MASS INDEX: 25.44 KG/M2 | TEMPERATURE: 98.2 F | HEART RATE: 98 BPM | WEIGHT: 149 LBS | HEIGHT: 64 IN | OXYGEN SATURATION: 100 % | RESPIRATION RATE: 20 BRPM

## 2025-05-28 DIAGNOSIS — F84.0 AUTISTIC SPECTRUM DISORDER: ICD-10-CM

## 2025-05-28 DIAGNOSIS — F41.9 ANXIETY: ICD-10-CM

## 2025-05-28 DIAGNOSIS — L81.9 DISCOLORATION OF SKIN OF TOE: Primary | ICD-10-CM

## 2025-05-28 DIAGNOSIS — G04.00 ADEM (ACUTE DISSEMINATED ENCEPHALOMYELITIS): ICD-10-CM

## 2025-05-28 PROCEDURE — 99214 OFFICE O/P EST MOD 30 MIN: CPT | Performed by: FAMILY MEDICINE

## 2025-05-28 RX ORDER — ALPRAZOLAM 0.5 MG
0.5 TABLET ORAL 3 TIMES DAILY PRN
Qty: 30 TABLET | Refills: 0 | Status: SHIPPED | OUTPATIENT
Start: 2025-05-28

## 2025-06-01 NOTE — ASSESSMENT & PLAN NOTE
Orders:  •  ALPRAZolam (XANAX) 0.5 mg tablet; Take 1 tablet (0.5 mg total) by mouth 3 (three) times a day as needed for anxiety or sleep

## 2025-06-01 NOTE — PROGRESS NOTES
"Name: Yolande Archibald      : 2000      MRN: [id number]  Encounter Provider: Kathleen Bailey MD  Encounter Date: 2025   Encounter department: [facility]  :  Assessment & Plan  Discoloration of skin of toe  ?Raynaud's syndrome  ?r/t underlying autoimmune d/o  Past KAILYN negative  Feel risk of CCB outweighs benefit as unable to monitor pt's BP/HR w/o sedative  D/w pt's mother self care techniques--eg keeping warm , leg elevation, regular exercise, maintaining good hydration  If sxs worsen t/c further evaluation/testing when next needing sedation for dental or other procedure       Anxiety    Orders:  •  ALPRAZolam (XANAX) 0.5 mg tablet; Take 1 tablet (0.5 mg total) by mouth 3 (three) times a day as needed for anxiety or sleep    ADEM (acute disseminated encephalomyelitis)    Orders:  •  ALPRAZolam (XANAX) 0.5 mg tablet; Take 1 tablet (0.5 mg total) by mouth 3 (three) times a day as needed for anxiety or sleep    Autistic spectrum disorder    Orders:  •  ALPRAZolam (XANAX) 0.5 mg tablet; Take 1 tablet (0.5 mg total) by mouth 3 (three) times a day as needed for anxiety or sleep           History of Present Illness   HPI    Pt in w mother  Mother c/o pt having intermittent toe discoloration especially on left  No trauma, no shoe misfit  No known hx autoimmune d/o--past KAILYN negative    Review of Systems  Per hpi    Past Medical History[1]  Past Surgical History[2]    Medications Ordered Prior to Encounter[3]    No Known Allergies    Objective   Pulse 98   Temp 98.2 °F (36.8 °C) (Temporal)   Resp 20   Ht 5' 3.5\" (1.613 m)   Wt 67.6 kg (149 lb)   SpO2 100%   BMI 25.98 kg/m²      Physical Exam  Vitals and nursing note reviewed.   Constitutional:       General: She is not in acute distress.    Eyes:      General: No scleral icterus.     Conjunctiva/sclera: Conjunctivae normal.       Cardiovascular:      Rate and Rhythm: Normal rate and regular rhythm.      Pulses: Normal pulses. "   Pulmonary:      Effort: Pulmonary effort is normal.     Musculoskeletal:      Right lower leg: No edema.      Left lower leg: No edema.     Skin:     General: Skin is warm and dry.      Coloration: Skin is not jaundiced.      Findings: No bruising, erythema, lesion or rash.     Neurological:      Mental Status: She is alert. Mental status is at baseline.                [1]  Past Medical History:  Diagnosis Date   • Abnormal uterine bleeding (AUB)    • ADEM (acute disseminated encephalomyelitis)    • Anemia    • Developmental delay    • Expressive dysphasia    • Humerus fracture     right--nondisplaced   • Seizures (HCC) 2007   [2]  Past Surgical History:  Procedure Laterality Date   • DENTAL EXAMINATION UNDER ANESTHESIA      every 3 years   [3]  Current Outpatient Medications on File Prior to Visit   Medication Sig Dispense Refill   • ARIPiprazole (ABILIFY) 10 mg tablet Take 10 mg by mouth every morning     • Cholecalciferol (Vitamin D3) 50 MCG (2000 UT) capsule TAKE 1 CAPSULE (2,000 UNITS) BY MOUTH IN THE MORNING 30 capsule 5   • diazepam (DIASTAT) 10 mg Insert into the rectum once     • fluticasone (FLONASE) 50 mcg/act nasal spray SPRAY 2 SPRAYS INTO EACH NOSTRIL EVERY DAY 16 mL 2   • lamoTRIgine (LaMICtal) 200 MG tablet Take 200 mg by mouth every morning     • lamoTRIgine (LaMICtal) 25 mg tablet Take 25 mg by mouth in the morning.     • loratadine (CLARITIN) 10 mg tablet Take 1 tablet (10 mg total) by mouth every morning 30 tablet 11   • Magnesium 70 MG CAPS Take by mouth     • multivitamin (THERAGRAN) TABS Take 1 tablet by mouth in the morning.     • norethindrone (AYGESTIN) 5 mg tablet Take 5 mg by mouth in the morning.     • polyethylene glycol (GLYCOLAX) 17 GM/SCOOP powder 17 GM IN MORNING DISSOLVE IN 8OZ OF WATER. 510 g 5   • QUEtiapine (SEROquel) 100 mg tablet Take 100 mg by mouth daily at bedtime     • QUEtiapine (SEROquel) 25 mg tablet Take 25 mg by mouth daily at bedtime     •  Trimethobenzamide-Benzocaine (TEBAMIDE RE) Insert into the rectum     • zonisamide (ZONEGRAN) 100 mg capsule      • Ferrous Gluconate-C-Folic Acid (IRON-C PO) Take 65 mg by mouth (Patient not taking: Reported on 5/28/2025)     • FLUoxetine (PROzac) 10 mg capsule Take 10 mg by mouth daily (Patient not taking: Reported on 5/28/2025)     • LORazepam (ATIVAN) 2 mg tablet Take 2 mg by mouth (Patient not taking: Reported on 5/28/2025)     • saccharomyces boulardii (FLORASTOR) 250 mg capsule Take 250 mg by mouth 2 (two) times a day (Patient not taking: Reported on 5/28/2025)       No current facility-administered medications on file prior to visit.

## 2025-08-02 DIAGNOSIS — K59.00 CONSTIPATION, UNSPECIFIED CONSTIPATION TYPE: ICD-10-CM

## 2025-08-04 RX ORDER — POLYETHYLENE GLYCOL 3350 17 G/17G
POWDER, FOR SOLUTION ORAL
Qty: 510 G | Refills: 5 | Status: SHIPPED | OUTPATIENT
Start: 2025-08-04